# Patient Record
Sex: MALE | Race: BLACK OR AFRICAN AMERICAN | Employment: FULL TIME | URBAN - METROPOLITAN AREA
[De-identification: names, ages, dates, MRNs, and addresses within clinical notes are randomized per-mention and may not be internally consistent; named-entity substitution may affect disease eponyms.]

---

## 2020-05-26 ENCOUNTER — OFFICE VISIT (OUTPATIENT)
Dept: ORTHOPEDIC SURGERY | Age: 25
End: 2020-05-26
Payer: COMMERCIAL

## 2020-05-26 VITALS — WEIGHT: 140 LBS | BODY MASS INDEX: 18.55 KG/M2 | HEIGHT: 73 IN | TEMPERATURE: 98.9 F

## 2020-05-26 PROCEDURE — 99204 OFFICE O/P NEW MOD 45 MIN: CPT | Performed by: ORTHOPAEDIC SURGERY

## 2020-05-26 SDOH — HEALTH STABILITY: MENTAL HEALTH: HOW OFTEN DO YOU HAVE A DRINK CONTAINING ALCOHOL?: NEVER

## 2020-05-26 NOTE — PROGRESS NOTES
Review of Systems
pertinent items are noted in HPI. Review of Systems        Physical Exam:  Temp 98.9 °F (37.2 °C) (Temporal)   Ht 6' 1\" (1.854 m)   Wt 140 lb (63.5 kg)   BMI 18.47 kg/m²     General: No acute distress, well nourished  CV: No obvious peripheral edema. Normal peripheral pulses  Neuro: Alert & oriented x 3  Psych: Normal mood and affect    Knee Examination: Left     Inspection: No edema or effusion, large healed prior incision  Alignment: proximal tibial varus  Palpation: There is patellofemoral crepitus, there is postero-medial joint line tenderness. Range of Motion:  0-110, good patellar mobility, <1cm heel height  Strength: Motor is grossly intact  Stability: 3mm medial and 5mm lateral opening, 2b lachman, positive anterior drawer, negative posterior drawer, grade 3 pivot shift, negative Dial test  Special Tests:   positive flexion/rotation test  Neuro: Sensation equal bilateral lower extremities   Vascular: 2+ posterior tibialis pulse        Contralateral Knee Comparison Examination: Right     Inspection:  No edema or effusion  Alignment: proximal tibial varus  Palpation: There is patellofemoral crepitus, there is no joint line tenderness. Range of Motion:  0-130  Strength: grossly intact  Stability: 2mm medial and 3mm lateral opening, 2a lachman, neg anterior drawer, neg posterior drawer,   Neuro: Sensation equal bilateral lower extremities  Vascular: 2+ posterior tibialis pulse      Radiographic:  Outside x rays reviewed in office, reviewed and interpreted by me today:  Views: 4  Location: left knee  Impression: shows significant narrowing in the medial compartement, patellofemoral and medial osteophytes, minimal lateral narrowing. 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

## 2020-06-22 ENCOUNTER — OFFICE VISIT (OUTPATIENT)
Dept: PRIMARY CARE CLINIC | Age: 25
End: 2020-06-22

## 2020-06-24 ENCOUNTER — TELEPHONE (OUTPATIENT)
Dept: ORTHOPEDIC SURGERY | Age: 25
End: 2020-06-24

## 2020-06-24 NOTE — TELEPHONE ENCOUNTER
Auth: # [id number]      Date: 06/26/2020  Type of SX:  OP  Location: [facility]  CPT: 07666   [id number]   DX Code: M92.52    SX area: Let Knee Scope bone grafting of tunnels and HTO  Insurance: [place] [employer]

## 2020-06-25 ENCOUNTER — TELEPHONE (OUTPATIENT)
Dept: ORTHOPEDIC SURGERY | Age: 25
End: 2020-06-25

## 2020-06-25 LAB
SARS-COV-2: DETECTED
SOURCE: ABNORMAL

## 2020-06-25 NOTE — RESULT ENCOUNTER NOTE
The patient was called for notification of a POSITIVE test result for COVID-19. The following information was given to the patient:    The COVID-19 test result was positive  Treatment of coronavirus does not require an antibiotic    Remain isolated for 10 days minimum or 72 hours after your symptoms have completely resolved, whichever is longer. Wash hands often with soap and water for at least 20 seconds or alternatively use hand  with at least 60% alcohol content    Cover coughs and sneezes    Wear a mask when around others if possible    Clean all \"high-touch\" surfaces every day, such as doorknobs and cellphones    Continually monitor symptoms. Contact a medical provider if symptoms are worsening, such as difficulty breathing. For additional information, please visit the Centers for Disease Control and Prevention at  [facility]..

## 2020-06-29 ENCOUNTER — TELEPHONE (OUTPATIENT)
Dept: ORTHOPEDIC SURGERY | Age: 25
End: 2020-06-29

## 2020-08-03 ENCOUNTER — NURSE ONLY (OUTPATIENT)
Dept: PRIMARY CARE CLINIC | Age: 25
End: 2020-08-03
Payer: COMMERCIAL

## 2020-08-03 PROCEDURE — 99211 OFF/OP EST MAY X REQ PHY/QHP: CPT | Performed by: NURSE PRACTITIONER

## 2020-08-04 LAB — SARS-COV-2, NAA: NOT DETECTED

## 2020-08-07 ENCOUNTER — NURSE ONLY (OUTPATIENT)
Dept: PRIMARY CARE CLINIC | Age: 25
End: 2020-08-07
Payer: COMMERCIAL

## 2020-08-07 PROCEDURE — 99211 OFF/OP EST MAY X REQ PHY/QHP: CPT | Performed by: NURSE PRACTITIONER

## 2020-08-09 LAB — SARS-COV-2, NAA: NOT DETECTED

## 2020-08-11 ENCOUNTER — OFFICE VISIT (OUTPATIENT)
Dept: ORTHOPEDIC SURGERY | Age: 25
End: 2020-08-11
Payer: COMMERCIAL

## 2020-08-11 VITALS — WEIGHT: 139.99 LBS | BODY MASS INDEX: 18.55 KG/M2 | HEIGHT: 73 IN | TEMPERATURE: 98.1 F

## 2020-08-11 PROCEDURE — E0114 CRUTCH UNDERARM PAIR NO WOOD: HCPCS | Performed by: ORTHOPAEDIC SURGERY

## 2020-08-11 PROCEDURE — PREOPEXAM PRE-OP EXAM: Performed by: ORTHOPAEDIC SURGERY

## 2020-08-11 PROCEDURE — MISC250 COMPRESSION STOCKING: Performed by: ORTHOPAEDIC SURGERY

## 2020-08-11 PROCEDURE — L1832 KO ADJ JNT POS R SUP PRE CST: HCPCS | Performed by: ORTHOPAEDIC SURGERY

## 2020-08-12 ENCOUNTER — TELEPHONE (OUTPATIENT)
Dept: ORTHOPEDIC SURGERY | Age: 25
End: 2020-08-12

## 2020-08-12 NOTE — TELEPHONE ENCOUNTER
8/12/20 [facility] [place] [facility]     -  NO PRECERT REQUIRED - PER   BETH @ [facility]  REF # [id number]  MP

## 2020-08-13 NOTE — PROGRESS NOTES
[facility]  History and Physical      Date:  2020    Name:  Yanick Peña  Address:  [place] [address]    :  1995      Age:   23 y.o.    SSN:  [id number]      Medical Record Number:  <[id number]>      Chief Complaint    Pre-op Exam (left knee, HTO/Bone grafting tunnels on 20)      History of Present Illness:  Yanick Peña is a 23 y.o. male who presents for their pre-op examination. The patient is in good health. The patient has no history of blood clots, no organ dysfunction, not diabetes, no known allergies to medications, tolerates pain medications and is not on any estrogen products. Patient does not smoke. The patient recently had a physical from her PCP and was cleared for surgery and stated being in good health. Patient's PCP note for preoperative clearance was reviewed. All the patient's labs were reviewed and were unremarkable. Pain Assessment  Location of Pain: Knee  Location Modifiers: Left  Severity of Pain: 4  Quality of Pain: Aching  Duration of Pain: A few days  Frequency of Pain: Constant  Aggravating Factors: Other (Comment)(n/a)  Relieving Factors: Rest  Result of Injury: Yes  Work-Related Injury: No  Are there other pain locations you wish to document?: No    No past medical history on file. Past Surgical History:   Procedure Laterality Date    KNEE ARTHROSCOPY      malissa. knees    WISDOM TOOTH EXTRACTION         No family history on file.     Social History     Socioeconomic History    Marital status: Single     Spouse name: None    Number of children: None    Years of education: None    Highest education level: None   Occupational History    None   Social Needs    Financial resource strain: None    Food insecurity     Worry: None     Inability: None    Transportation needs     Medical: None     Non-medical: None   Tobacco Use    Smoking status: Never Smoker    Smokeless tobacco: Never Used Substance and Sexual Activity    Alcohol use: Yes     Frequency: Never     Comment: socially     Drug use: Never    Sexual activity: None   Lifestyle    Physical activity     Days per week: None     Minutes per session: None    Stress: None   Relationships    Social connections     Talks on phone: None     Gets together: None     Attends Congregation service: None     Active member of club or organization: None     Attends meetings of clubs or organizations: None     Relationship status: None    Intimate partner violence     Fear of current or ex partner: None     Emotionally abused: None     Physically abused: None     Forced sexual activity: None   Other Topics Concern    None   Social History Narrative    None       No current outpatient medications on file. No current facility-administered medications for this visit. No Known Allergies    Review of Systems:  A 14 point review of systems was completed by the patient and is available in the media section of the scanned medical record and was reviewed on 8/13/2020. The review is negative with the exception of those things mentioned in the HPI and Past Medical History   Review of Systems   Musculoskeletal: Positive for arthralgias and myalgias. Neurological: Negative for weakness and numbness. Vital Signs:   Temp 98.1 °F (36.7 °C) (Infrared)   Ht 6' 0.99\" (1.854 m)   Wt 139 lb 15.9 oz (63.5 kg)   BMI 18.47 kg/m²     General Exam:    Neuro: Alert & Oriented x 3,  normal,  no focal deficits noted. Normal mood & affect. Eyes: Sclera clear  Ears: Normal external ear  Mouth: Patient wearing a mask  Pulm: Respirations unlabored and regular   Skin: Warm, well perfused    Knee Examination: Left    Skin: Well-healed scars from prior surgery. There are no skin lesions, cellulitis, or extreme edema in the lower extremities. Sensation is grossly intact to light touch bilaterally lower extremity.  The patient has warm and well-perfused Bilateral required for the following reasons:    Patient has mobility limitations that significantly impair their ability to participate in one or more mobility related activities of daily living. The patient is able to safely use the mobility device. Functional mobility deficit can be sufficiently resolved with the use of this device. Verbal and written instructions for the use of and application of this item were provided. The patient was educated and fit by a healthcare professional with expert knowledge and specialization in brace application while under the direct supervision of the treating physician. They were instructed to contact the office immediately should the equipment result in increased pain, decreased sensation, increased swelling or worsening of the condition.  DJO TROM Knee Brace     Patient was prescribed a DJO TROM Knee Brace. The left knee will require stabilization / immobilization from this semi-rigid / rigid orthosis to improve their function. The orthosis will assist in protecting the affected area, provide functional support and facilitate healing. The prefabricated orthosis was modified in the following manner to provide a customizable fit for the patient at the time of delivery. 1.  Identification of appropriate positioning and alignment of anatomical landmarks. 2.  Trimming of straps and adjustment of frame to specifically fit patient. 3.  Polycentric hinge adjustment in flexion and extension. The patient was educated and fit by a healthcare professional with expert knowledge and specialization in brace application while under the direct supervision of the treating physician. Verbal and written instructions for the use of and application of this item were provided. They were instructed to contact the office immediately should the brace result in increased pain, decreased sensation, increased swelling or worsening of the condition.             Assessment: Patient is a 23 therefore the surgery was rescheduled for tomorrow (08/12/2020). All the patient's questions were answered while in the clinic. The patient is understanding of all instructions and agrees with the plan. 08/13/20  8:44 AM                Neptali Kahn PA-C    Physician Assistant - Certified  [facility] and [address] [place] [address]    08/13/20 8:44 AM    During this examination, I, [address] [place], [place], functioned as a scribe for Dr. Arleen Scott. This dictation was performed with a verbal recognition program (DRAGON) and it was checked for errors. It is possible that there are still dictated errors within this office note. If so, please bring any errors to my attention for an addendum. All efforts were made to ensure that this office note is accurate. This dictation was performed with a verbal recognition program (DRAGON) and it was checked for errors. It is possible that there are still dictated errors within this office note. If so, please bring any errors to my attention for an addendum. All efforts were made to ensure that this office note is accurate. The correction of the varus angulation would involve a 13 degree posterior medial opening wedge and with complete closure of the anterior portion of the osteotomy approximately 14 mm of    Supervising Physician Attestation:  I, Dr. Arleen Scott, personally performed the services described in this documentation as scribed above, and it is both accurate and complete and I agree with all pertinent clinical information. I personally interviewed the patient and performed a physical examination and medical decision making. I discussed the patient's condition and treatment options and have  reviewed and agree with the past medical, family and social history unless otherwise noted. All of the patient's questions were answered.       Board Certified Orthopaedic Surgeon  [facility] and [facility]